# Patient Record
Sex: MALE | Race: WHITE | URBAN - METROPOLITAN AREA
[De-identification: names, ages, dates, MRNs, and addresses within clinical notes are randomized per-mention and may not be internally consistent; named-entity substitution may affect disease eponyms.]

---

## 2024-05-07 ENCOUNTER — OFFICE VISIT (OUTPATIENT)
Dept: INTERNAL MEDICINE CLINIC | Facility: CLINIC | Age: 44
End: 2024-05-07

## 2024-05-07 VITALS
RESPIRATION RATE: 18 BRPM | WEIGHT: 219 LBS | SYSTOLIC BLOOD PRESSURE: 128 MMHG | HEART RATE: 72 BPM | DIASTOLIC BLOOD PRESSURE: 83 MMHG

## 2024-05-07 DIAGNOSIS — Z00.00 PHYSICAL EXAM, ANNUAL: Primary | ICD-10-CM

## 2024-05-07 DIAGNOSIS — Z12.11 COLON CANCER SCREENING: ICD-10-CM

## 2024-05-07 PROCEDURE — 90715 TDAP VACCINE 7 YRS/> IM: CPT | Performed by: INTERNAL MEDICINE

## 2024-05-07 PROCEDURE — 3074F SYST BP LT 130 MM HG: CPT | Performed by: INTERNAL MEDICINE

## 2024-05-07 PROCEDURE — 3079F DIAST BP 80-89 MM HG: CPT | Performed by: INTERNAL MEDICINE

## 2024-05-07 PROCEDURE — 99396 PREV VISIT EST AGE 40-64: CPT | Performed by: INTERNAL MEDICINE

## 2024-05-07 PROCEDURE — 90471 IMMUNIZATION ADMIN: CPT | Performed by: INTERNAL MEDICINE

## 2024-05-07 RX ORDER — CHLORTHALIDONE 25 MG/1
25 TABLET ORAL DAILY
COMMUNITY
Start: 2024-05-07

## 2024-05-07 RX ORDER — METOPROLOL SUCCINATE 100 MG/1
100 TABLET, EXTENDED RELEASE ORAL DAILY
COMMUNITY
Start: 2024-05-07

## 2024-05-07 RX ORDER — AMLODIPINE BESYLATE 10 MG/1
10 TABLET ORAL DAILY
COMMUNITY
Start: 2024-05-07

## 2024-05-12 ENCOUNTER — TELEPHONE (OUTPATIENT)
Dept: INTERNAL MEDICINE CLINIC | Facility: CLINIC | Age: 44
End: 2024-05-12

## 2024-05-12 NOTE — PROGRESS NOTES
Subjective:     Patient ID: Jarrod Arellano is a 43 year old male.  Presents for physical exam  HPI  Patient is new to the practice, states that he has been feeling well, he is being treated for hypertension for several years now takes medications on a regular basis reports no side effects.  He denies chest pains or shortness .  He works physically    Review of Systems       Constitutional:  Negative for decreased activity, fever, irritability and lethargy  Cardiovascular:  Negative for chest pain and irregular heartbeat/palpitations  Respiratory:  Negative for cough, dyspnea and wheezing.  Eyes:  Negative for eye discharge and vision loss  Endocrine:  Negative for polydipsia and polyphagia  Integumentary:  Negative for pruritus and rash  Neurological:  Negative for gait disturbance, paresthesias.   Psychiatric:  Negative for inappropriate interaction and psychiatric symptoms    Allergies:No Known Allergies    History reviewed. No pertinent past medical history.   History reviewed. No pertinent surgical history.   History reviewed. No pertinent family history.   Social History:   Social History     Socioeconomic History    Marital status:    Tobacco Use    Smoking status: Unknown   Vaping Use    Vaping status: Every Day    Substances: Nicotine   Substance and Sexual Activity    Alcohol use: Yes     Comment: occ    Drug use: Never        /83 (BP Location: Left arm, Patient Position: Sitting, Cuff Size: large)   Pulse 72   Resp 18   Wt 219 lb (99.3 kg)    Physical Exam  Constitutional:       Appearance: Normal appearance.   HENT:      Head: Normocephalic and atraumatic.      Right Ear: Tympanic membrane, ear canal and external ear normal.      Left Ear: Tympanic membrane, ear canal and external ear normal.   Eyes:      General:         Left eye: No discharge.      Extraocular Movements: Extraocular movements intact.      Conjunctiva/sclera: Conjunctivae normal.      Pupils: Pupils are equal, round,  and reactive to light.   Neck:      Vascular: No carotid bruit.   Cardiovascular:      Rate and Rhythm: Normal rate and regular rhythm.      Heart sounds: No murmur heard.     No gallop.   Pulmonary:      Effort: Pulmonary effort is normal. No respiratory distress.      Breath sounds: No wheezing or rhonchi.   Abdominal:      General: Bowel sounds are normal.      Palpations: Abdomen is soft. There is no mass.      Tenderness: There is no abdominal tenderness. There is no guarding or rebound.   Musculoskeletal:         General: Normal range of motion.      Cervical back: Normal range of motion and neck supple.      Right lower leg: No edema.      Left lower leg: No edema.   Lymphadenopathy:      Cervical: No cervical adenopathy.   Skin:     General: Skin is warm.      Coloration: Skin is not jaundiced.   Neurological:      General: No focal deficit present.      Mental Status: He is alert and oriented to person, place, and time. Mental status is at baseline.   Psychiatric:         Mood and Affect: Mood normal.         Behavior: Behavior normal.         Thought Content: Thought content normal.         Assessment & Plan:   1. Physical exam, annual patient will continue healthy diet, regular physical activities encouraged we will check labs   2. Colon cancer screening referred patient to see gastroenterology discussed procedure   3 primary hypertension controlled on metoprolol, amlodipine, chlorthalidone,.      Monitor kidney function test and potassium periodically    Orders Placed This Encounter   Procedures    CBC With Differential With Platelet    Comp Metabolic Panel (14)    TSH W Reflex To Free T4    Lipid Panel    Urinalysis with Culture Reflex [E]    TETANUS, DIPHTHERIA TOXOIDS AND ACELLULAR PERTUSIS VACCINE (TDAP), >7 YEARS, IM USE       Meds This Visit:  Requested Prescriptions      No prescriptions requested or ordered in this encounter       Imaging & Referrals:  TETANUS, DIPHTHERIA TOXOIDS AND ACELLULAR  PERTUSIS VACCINE (TDAP), >7 YEARS, IM USE  GASTRO - INTERNAL     Follow-up in 6 months

## 2024-05-19 ENCOUNTER — APPOINTMENT (OUTPATIENT)
Dept: GENERAL RADIOLOGY | Age: 44
End: 2024-05-19
Attending: EMERGENCY MEDICINE

## 2024-05-19 ENCOUNTER — HOSPITAL ENCOUNTER (OUTPATIENT)
Age: 44
Discharge: HOME | End: 2024-05-19
Attending: EMERGENCY MEDICINE

## 2024-05-19 VITALS
HEART RATE: 82 BPM | SYSTOLIC BLOOD PRESSURE: 143 MMHG | OXYGEN SATURATION: 97 % | TEMPERATURE: 99 F | RESPIRATION RATE: 16 BRPM | DIASTOLIC BLOOD PRESSURE: 93 MMHG

## 2024-05-19 DIAGNOSIS — R58 BLEEDING FROM FINGER: Primary | ICD-10-CM

## 2024-05-19 PROCEDURE — 73140 X-RAY EXAM OF FINGER(S): CPT | Performed by: EMERGENCY MEDICINE

## 2024-05-19 PROCEDURE — 12001 RPR S/N/AX/GEN/TRNK 2.5CM/<: CPT

## 2024-05-19 PROCEDURE — 99203 OFFICE O/P NEW LOW 30 MIN: CPT

## 2024-05-19 PROCEDURE — 99213 OFFICE O/P EST LOW 20 MIN: CPT

## 2024-05-19 NOTE — ED PROVIDER NOTES
Patient Seen in: [facility] [place]      History     Chief Complaint   Patient presents with    Wound Care     Entered by patient     Stated Complaint: Wound Care    Subjective:     43-year-old male presents today for evaluation of bleeding from his right lateral finger.  Patient reports he had a blood blister there for the past week.  Reports it popped yesterday.  He changed to Band-Aid this morning and started bleeding would not stop.  He says it has been bleeding for over 2 hours.  No blood thinners.  Symptoms are acute mild.  Patient is not sure if there is anything in the finger.  Symptoms are acute mild.    Objective:   No pertinent past medical history.            No pertinent past surgical history.              No pertinent social history.              Physical Exam     ED Triage Vitals [05/19/24 0853]   BP (!) 143/93   Pulse 82   Resp 16   Temp 98.5 °F (36.9 °C)   Temp src Temporal   SpO2 97 %   O2 Device None (Room air)       Current:BP (!) 143/93   Pulse 82   Temp 98.5 °F (36.9 °C) (Temporal)   Resp 16   SpO2 97%         Physical Exam  Vitals reviewed.   Constitutional:       General: He is not in acute distress.     Appearance: Normal appearance. He is not ill-appearing or toxic-appearing.   HENT:      Head: Normocephalic and atraumatic.      Mouth/Throat:      Mouth: Mucous membranes are moist.      Pharynx: No pharyngeal swelling.   Eyes:      Conjunctiva/sclera: Conjunctivae normal.   Musculoskeletal:        Hands:       Cervical back: Neck supple.   Skin:     General: Skin is warm and dry.   Neurological:      Mental Status: He is alert and oriented to person, place, and time.   Psychiatric:         Mood and Affect: Mood normal.         ED Course   Labs Reviewed - No data to display  Imaging:  XR FINGER(S) (MIN 2 VIEWS), RIGHT 4TH (CPT=73140)    Result Date: 5/19/2024  PROCEDURE: XR FINGER(S) (MIN 2 VIEWS), RIGHT 4TH (CPT=73140)  COMPARISON: None.  INDICATIONS: Pain and bleeding wound,  distal aspect of right hand 4th digit x 1 week. No known trauma.  TECHNIQUE: Three-view Findings and impression:  Normal alignment with no fracture.  No radiopaque body or soft tissue gas.  No osteomyelitis.     Dictated by (CST): Ceferino Suggs MD on 5/19/2024 at 9:35 AM     Finalized by (CST): Ceferino Suggs MD on 5/19/2024 at 9:35 AM           ED Course as of 05/19/24 0939  ------------------------------------------------------------  Time: 05/19 0917  Comment: Wound irrigated.  No foreign body visualized.  Dermabond placed.  Bleeding is stopped.  ------------------------------------------------------------  Time: 05/19 0938  Comment: X-rays unremarkable.  No bleeding.  Will discharge home.            MDM        43 year old male with right finger bleeding.  Will check imaging.    Differential diagnosis (including but not limited to):  Puncture, foreign body, coagulopathy    ED course:  Pulse Ox: 97% on room air which is normal      Comment: Please note this report has been produced using speech recognition software and may contain errors related to that system including errors in grammar, punctuation, and spelling, as well as words and phrases that may be inappropriate. If there are any questions or concerns please feel free to contact the dictating provider for clarification.                                     Medical Decision Making      Disposition and Plan     Clinical Impression:  1. Bleeding from finger         Disposition:  Discharge  5/19/2024  9:39 am    Follow-up:  Gio Gonzalez MD  [facility] [id number] [phone]    Schedule an appointment as soon as possible for a visit   This is a hand specialist.          Medications Prescribed:  Current Discharge Medication List                                 Herb Garcia MD  5/19/2024  9:13 AM

## 2024-05-19 NOTE — DISCHARGE INSTRUCTIONS
Thank you for visiting our immediate care for your health care needs.  Please follow up with hand doctor as referred next week.  If you have any additional problems please return to the immediate care.

## 2024-05-19 NOTE — ED INITIAL ASSESSMENT (HPI)
Patient states he noticed a blood blister to this right ring fingertip a couple days ago/ he denies injury  States maybe there is a splinter there  He used neosporin and bandaids at home   This morning the area wont stop bleeding

## 2024-06-26 ENCOUNTER — TELEPHONE (OUTPATIENT)
Dept: INTERNAL MEDICINE CLINIC | Facility: CLINIC | Age: 44
End: 2024-06-26

## 2024-06-26 RX ORDER — METOPROLOL SUCCINATE 100 MG/1
100 TABLET, EXTENDED RELEASE ORAL DAILY
Qty: 90 TABLET | Refills: 0 | Status: SHIPPED | OUTPATIENT
Start: 2024-06-26

## 2024-06-26 NOTE — TELEPHONE ENCOUNTER
Please review.  Protocol failed / Has no protocol.    [facility] message sent to patient to complete labs pended from last office visit 5/7/2024.     Requested Prescriptions   Pending Prescriptions Disp Refills    metoprolol succinate  MG Oral Tablet 24 Hr  0     Sig: Take 1 tablet (100 mg total) by mouth daily.       Hypertension Medications Protocol Failed - 6/24/2024 11:18 AM        Failed - CMP or BMP in past 12 months        Failed - Last BP reading less than 140/90     BP Readings from Last 1 Encounters:   05/19/24 (!) 143/93               Failed - EGFRCR or GFRNAA > 50     GFR Evaluation            Passed - In person appointment or virtual visit in the past 12 mos or appointment in next 3 mos     Recent Outpatient Visits              1 month ago Physical exam, annual    [facility], [facility] [address], [place] Kandel, Ninel, MD    Office Visit                           Recent Outpatient Visits              1 month ago Physical exam, annual    [facility], [facility], [place] Kandel, Ninel, MD    Office Visit

## 2024-06-26 NOTE — TELEPHONE ENCOUNTER
Patient requested refill on mychart 6/24 , completely out     [id number] - [place], [place] - [address] AT [facility]     Medication Detail    Medication Quantity Refills Start End   metoprolol succinate  MG Oral Tablet 24 Hr -- -- 5/7/2024 --   Sig:   Take 1 tablet (100 mg total) by mouth daily.     Route:   Oral     Class:   Historical     Order #:   [id number]

## 2024-07-13 ENCOUNTER — LAB ENCOUNTER (OUTPATIENT)
Dept: LAB | Facility: REFERENCE LAB | Age: 44
End: 2024-07-13
Attending: INTERNAL MEDICINE
Payer: COMMERCIAL

## 2024-07-13 DIAGNOSIS — Z00.00 PHYSICAL EXAM, ANNUAL: ICD-10-CM

## 2024-07-13 LAB
ALBUMIN SERPL-MCNC: 4.8 G/DL (ref 3.2–4.8)
ALBUMIN/GLOB SERPL: 1.7 {RATIO} (ref 1–2)
ALP LIVER SERPL-CCNC: 68 U/L
ALT SERPL-CCNC: 44 U/L
ANION GAP SERPL CALC-SCNC: 7 MMOL/L (ref 0–18)
AST SERPL-CCNC: 32 U/L (ref ?–34)
BASOPHILS # BLD AUTO: 0.01 X10(3) UL (ref 0–0.2)
BASOPHILS NFR BLD AUTO: 0.2 %
BILIRUB SERPL-MCNC: 0.8 MG/DL (ref 0.3–1.2)
BILIRUB UR QL: NEGATIVE
BUN BLD-MCNC: 15 MG/DL (ref 9–23)
BUN/CREAT SERPL: 13.4 (ref 10–20)
CALCIUM BLD-MCNC: 9.8 MG/DL (ref 8.7–10.4)
CHLORIDE SERPL-SCNC: 102 MMOL/L (ref 98–112)
CHOLEST SERPL-MCNC: 180 MG/DL (ref ?–200)
CO2 SERPL-SCNC: 30 MMOL/L (ref 21–32)
COLOR UR: YELLOW
CREAT BLD-MCNC: 1.12 MG/DL
DEPRECATED RDW RBC AUTO: 34.4 FL (ref 35.1–46.3)
EGFRCR SERPLBLD CKD-EPI 2021: 83 ML/MIN/1.73M2 (ref 60–?)
EOSINOPHIL # BLD AUTO: 0.04 X10(3) UL (ref 0–0.7)
EOSINOPHIL NFR BLD AUTO: 0.8 %
ERYTHROCYTE [DISTWIDTH] IN BLOOD BY AUTOMATED COUNT: 14.9 % (ref 11–15)
FASTING PATIENT LIPID ANSWER: YES
FASTING STATUS PATIENT QL REPORTED: YES
GLOBULIN PLAS-MCNC: 2.9 G/DL (ref 2–3.5)
GLUCOSE BLD-MCNC: 102 MG/DL (ref 70–99)
GLUCOSE UR-MCNC: NORMAL MG/DL
HCT VFR BLD AUTO: 45.8 %
HDLC SERPL-MCNC: 32 MG/DL (ref 40–59)
HGB BLD-MCNC: 14.7 G/DL
HGB UR QL STRIP.AUTO: NEGATIVE
IMM GRANULOCYTES # BLD AUTO: 0.02 X10(3) UL (ref 0–1)
IMM GRANULOCYTES NFR BLD: 0.4 %
KETONES UR-MCNC: NEGATIVE MG/DL
LDLC SERPL CALC-MCNC: 117 MG/DL (ref ?–100)
LEUKOCYTE ESTERASE UR QL STRIP.AUTO: NEGATIVE
LYMPHOCYTES # BLD AUTO: 1.41 X10(3) UL (ref 1–4)
LYMPHOCYTES NFR BLD AUTO: 28.3 %
MCH RBC QN AUTO: 22 PG (ref 26–34)
MCHC RBC AUTO-ENTMCNC: 32.1 G/DL (ref 31–37)
MCV RBC AUTO: 68.7 FL
MONOCYTES # BLD AUTO: 0.39 X10(3) UL (ref 0.1–1)
MONOCYTES NFR BLD AUTO: 7.8 %
NEUTROPHILS # BLD AUTO: 3.11 X10 (3) UL (ref 1.5–7.7)
NEUTROPHILS # BLD AUTO: 3.11 X10(3) UL (ref 1.5–7.7)
NEUTROPHILS NFR BLD AUTO: 62.5 %
NITRITE UR QL STRIP.AUTO: NEGATIVE
NONHDLC SERPL-MCNC: 148 MG/DL (ref ?–130)
OSMOLALITY SERPL CALC.SUM OF ELEC: 289 MOSM/KG (ref 275–295)
PH UR: 5.5 [PH] (ref 5–8)
PLATELET # BLD AUTO: 189 10(3)UL (ref 150–450)
PLATELETS.RETICULATED NFR BLD AUTO: 11.6 % (ref 0–7)
POTASSIUM SERPL-SCNC: 4.4 MMOL/L (ref 3.5–5.1)
PROT SERPL-MCNC: 7.7 G/DL (ref 5.7–8.2)
RBC # BLD AUTO: 6.67 X10(6)UL
SODIUM SERPL-SCNC: 139 MMOL/L (ref 136–145)
SP GR UR STRIP: 1.03 (ref 1–1.03)
TRIGL SERPL-MCNC: 171 MG/DL (ref 30–149)
TSI SER-ACNC: 1.29 MIU/ML (ref 0.55–4.78)
UROBILINOGEN UR STRIP-ACNC: NORMAL
VLDLC SERPL CALC-MCNC: 30 MG/DL (ref 0–30)
WBC # BLD AUTO: 5 X10(3) UL (ref 4–11)

## 2024-07-13 PROCEDURE — 85025 COMPLETE CBC W/AUTO DIFF WBC: CPT

## 2024-07-13 PROCEDURE — 80053 COMPREHEN METABOLIC PANEL: CPT

## 2024-07-13 PROCEDURE — 85060 BLOOD SMEAR INTERPRETATION: CPT

## 2024-07-13 PROCEDURE — 84443 ASSAY THYROID STIM HORMONE: CPT

## 2024-07-13 PROCEDURE — 80061 LIPID PANEL: CPT

## 2024-07-13 PROCEDURE — 81001 URINALYSIS AUTO W/SCOPE: CPT

## 2024-07-13 PROCEDURE — 36415 COLL VENOUS BLD VENIPUNCTURE: CPT

## 2024-08-27 ENCOUNTER — TELEPHONE (OUTPATIENT)
Dept: INTERNAL MEDICINE CLINIC | Facility: CLINIC | Age: 44
End: 2024-08-27

## 2024-08-27 NOTE — TELEPHONE ENCOUNTER
Current Outpatient Medications:     metoprolol succinate  MG Oral Tablet 24 Hr, Take 1 tablet (100 mg total) by mouth daily. **Please complete labs, Disp: 90 tablet, Rfl: 0

## 2024-08-28 ENCOUNTER — TELEPHONE (OUTPATIENT)
Dept: FAMILY MEDICINE CLINIC | Facility: CLINIC | Age: 44
End: 2024-08-28

## 2024-08-28 NOTE — TELEPHONE ENCOUNTER
Reason for request:    Patient requested new RX    Pharmacy comments:  PT requests Metropolol big Dosing

## 2024-08-29 ENCOUNTER — TELEPHONE (OUTPATIENT)
Dept: INTERNAL MEDICINE CLINIC | Facility: CLINIC | Age: 44
End: 2024-08-29

## 2024-08-29 RX ORDER — AMLODIPINE BESYLATE 10 MG/1
10 TABLET ORAL DAILY
Qty: 90 TABLET | Refills: 0 | Status: SHIPPED | OUTPATIENT
Start: 2024-08-29

## 2024-08-29 RX ORDER — METOPROLOL SUCCINATE 100 MG/1
100 TABLET, EXTENDED RELEASE ORAL DAILY
Qty: 90 TABLET | Refills: 0 | Status: SHIPPED | OUTPATIENT
Start: 2024-08-29

## 2024-08-29 RX ORDER — CHLORTHALIDONE 25 MG/1
25 TABLET ORAL DAILY
Qty: 90 TABLET | Refills: 0 | Status: SHIPPED | OUTPATIENT
Start: 2024-08-29

## 2024-08-29 NOTE — TELEPHONE ENCOUNTER
Please review; protocol failed/No Protocol    Metoprolol ER 100mg: Please advise on patient's comment below  Patient comment: Im supposed to be taking 1.5 pills a day. I keep running out early. Also each month i have to manually request the refill from you. Can i get a few authorized in advance pls       Requested Prescriptions   Pending Prescriptions Disp Refills    chlorthalidone 25 MG Oral Tab  0     Sig: Take 1 tablet (25 mg total) by mouth daily.       Hypertension Medications Protocol Failed - 8/27/2024  4:16 PM        Failed - Last BP reading less than 140/90     BP Readings from Last 1 Encounters:   05/19/24 (!) 143/93               Passed - CMP or BMP in past 12 months        Passed - In person appointment or virtual visit in the past 12 mos or appointment in next 3 mos     Recent [facility]              3 [facility]    [facility]                      Passed - EGFRCR or GFRNAA > 50     GFR Evaluation  EGFRCR: 83 , resulted on 7/13/2024            amLODIPine 10 MG Oral Tab  0     Sig: Take 1 tablet (10 mg total) by mouth daily.       Hypertension Medications Protocol Failed - 8/27/2024  4:16 PM        Failed - Last BP reading less than 140/90     BP Readings from Last 1 Encounters:   05/19/24 (!) 143/93               Passed - CMP or BMP in past 12 months        Passed - In person appointment or virtual visit in the past 12 mos or appointment in next 3 mos     Recent Outpatient Visits              3 months ago Physical exam, annual    [facility], [facility] [place] Kandel, Ninel, MD    Office Visit                      Passed - EGFRCR or GFRNAA > 50     GFR Evaluation  EGFRCR: 83 , resulted on 7/13/2024            metoprolol succinate  MG Oral Tablet 24 Hr 90 tablet 0     Sig: Take 1 tablet (100 mg total) by mouth daily. **Please complete labs       Hypertension Medications  Protocol Failed - 8/27/2024  4:16 PM        Failed - Last BP reading less than 140/90     BP Readings from Last 1 Encounters:   05/19/24 (!) 143/93               Passed - CMP or BMP in past 12 months        Passed - In person appointment or virtual visit in the past 12 mos or appointment in next 3 mos     Recent Outpatient Visits              3 months ago Physical exam, annual    [facility], [facility] [address], [place] Kandel, Ninel, MD    Office Visit                      Passed - EGFRCR or GFRNAA > 50     GFR Evaluation  EGFRCR: 83 , resulted on 7/13/2024               Recent Outpatient Visits              3 months ago Physical exam, annual    [facility], [facility], [place] Kandel, Ninel, MD    Office Visit

## 2024-08-30 NOTE — TELEPHONE ENCOUNTER
Yahaira Duenas, RN 8/30/2024 8:31 AM CDT        ----- Message -----  From: Jarrod Arellano  Sent: 8/29/2024 7:46 PM CDT  To: Em Rn Triage  Subject: Medication refill     Thank you but I’m out of the Metoprolol in three days. My old dr had prescribed me 1.5 pills a day. I just realized it was switched to 1 per day which is why I ran out early. Can you pls refill that one and I’ll make an appt.     Thanks

## 2024-10-29 ENCOUNTER — OFFICE VISIT (OUTPATIENT)
Dept: INTERNAL MEDICINE CLINIC | Facility: CLINIC | Age: 44
End: 2024-10-29

## 2024-10-29 VITALS
WEIGHT: 218.13 LBS | HEART RATE: 76 BPM | SYSTOLIC BLOOD PRESSURE: 126 MMHG | HEIGHT: 73 IN | DIASTOLIC BLOOD PRESSURE: 83 MMHG | BODY MASS INDEX: 28.91 KG/M2

## 2024-10-29 DIAGNOSIS — I10 PRIMARY HYPERTENSION: Primary | ICD-10-CM

## 2024-10-29 DIAGNOSIS — Z71.6 ENCOUNTER FOR TOBACCO USE CESSATION COUNSELING: ICD-10-CM

## 2024-10-29 PROCEDURE — 99213 OFFICE O/P EST LOW 20 MIN: CPT | Performed by: INTERNAL MEDICINE

## 2024-10-29 PROCEDURE — 3008F BODY MASS INDEX DOCD: CPT | Performed by: INTERNAL MEDICINE

## 2024-10-29 PROCEDURE — 3079F DIAST BP 80-89 MM HG: CPT | Performed by: INTERNAL MEDICINE

## 2024-10-29 PROCEDURE — 3074F SYST BP LT 130 MM HG: CPT | Performed by: INTERNAL MEDICINE

## 2024-10-29 RX ORDER — METOPROLOL SUCCINATE 100 MG/1
TABLET, EXTENDED RELEASE ORAL
Qty: 135 TABLET | Refills: 3 | Status: SHIPPED | OUTPATIENT
Start: 2024-10-29

## 2024-10-29 RX ORDER — AMLODIPINE BESYLATE 10 MG/1
10 TABLET ORAL DAILY
Qty: 90 TABLET | Refills: 3 | Status: SHIPPED | OUTPATIENT
Start: 2024-10-29

## 2024-10-29 RX ORDER — CHLORTHALIDONE 25 MG/1
25 TABLET ORAL DAILY
Qty: 90 TABLET | Refills: 1 | Status: SHIPPED | OUTPATIENT
Start: 2024-10-29

## 2024-10-31 ENCOUNTER — TELEPHONE (OUTPATIENT)
Dept: INTERNAL MEDICINE CLINIC | Facility: CLINIC | Age: 44
End: 2024-10-31

## 2024-10-31 NOTE — TELEPHONE ENCOUNTER
Patient called stating that his prescriptions that were sent on 10/29/2024 were sent to the wrong pharmacy. Per patient please send the medications to the following pharmacy:    [facility]     [facility] [address] [place] [address]    He stated he is almost out of the Metoprolol.

## 2024-11-03 NOTE — PROGRESS NOTES
Subjective:     Patient ID: Jarrod Arellano is a 44 year old male.  For follow-up on hypertension    HPI  Patient reports that he has been feeling well, takes medication as prescribed.  Continues to smoke, states that not ready to quit.  Does not check blood pressure at home      Current Outpatient Medications   Medication Sig Dispense Refill    metoprolol succinate  MG Oral Tablet 24 Hr Take  1  1/2tab po daily 135 tablet 3    chlorthalidone 25 MG Oral Tab Take 1 tablet (25 mg total) by mouth daily. 90 tablet 1    amLODIPine 10 MG Oral Tab Take 1 tablet (10 mg total) by mouth daily. 90 tablet 3     Allergies:Allergies[1]    History reviewed. No pertinent past medical history.   History reviewed. No pertinent surgical history.   History reviewed. No pertinent family history.   Social History:   Social History     Socioeconomic History    Marital status:    Tobacco Use    Smoking status: Every Day     Types: Cigarettes   Vaping Use    Vaping status: Every Day    Substances: Nicotine   Substance and Sexual Activity    Alcohol use: Yes     Comment: occ    Drug use: Never        /83 (BP Location: Left arm, Patient Position: Sitting, Cuff Size: adult)   Pulse 76   Ht 6' 1\" (1.854 m)   Wt 218 lb 1.6 oz (98.9 kg)   BMI 28.77 kg/m²    Physical Exam  Constitutional:       Appearance: Normal appearance.   HENT:      Right Ear: Ear canal and external ear normal.      Left Ear: Ear canal and external ear normal.   Eyes:      General: No scleral icterus.     Extraocular Movements: Extraocular movements intact.      Conjunctiva/sclera: Conjunctivae normal.      Pupils: Pupils are equal, round, and reactive to light.   Neck:      Vascular: No carotid bruit.   Cardiovascular:      Rate and Rhythm: Normal rate and regular rhythm.   Pulmonary:      Effort: Pulmonary effort is normal.      Breath sounds: No wheezing or rales.   Chest:      Chest wall: No tenderness.   Abdominal:      General: Bowel sounds are  normal.      Palpations: Abdomen is soft. There is no mass.      Tenderness: There is no guarding or rebound.   Musculoskeletal:         General: Normal range of motion.      Cervical back: Normal range of motion and neck supple.      Right lower leg: No edema.      Left lower leg: No edema.   Lymphadenopathy:      Cervical: No cervical adenopathy.   Skin:     General: Skin is warm.      Coloration: Skin is not jaundiced.   Neurological:      General: No focal deficit present.      Mental Status: He is alert and oriented to person, place, and time. Mental status is at baseline.   Psychiatric:         Mood and Affect: Mood normal.         Behavior: Behavior normal.         Thought Content: Thought content normal.         Assessment & Plan:   1. Primary hypertension: Current medication, will check BMP advised that lites should be checked at least twice a year.   2. Encounter for tobacco use cessation counseling    I advised to consider colon cancer screening with colonoscopy,    Orders Placed This Encounter   Procedures    Basic Metabolic Panel (8) [E]    Smoking Cessation less than 3 minutes       Meds This Visit:  Requested Prescriptions     Signed Prescriptions Disp Refills    metoprolol succinate  MG Oral Tablet 24 Hr 135 tablet 3     Sig: Take  1  1/2tab po daily    chlorthalidone 25 MG Oral Tab 90 tablet 1     Sig: Take 1 tablet (25 mg total) by mouth daily.    amLODIPine 10 MG Oral Tab 90 tablet 3     Sig: Take 1 tablet (10 mg total) by mouth daily.       Imaging & Referrals:  EKG 12-LEAD            [1]   Allergies  Allergen Reactions    Tree Nuts NAUSEA AND VOMITING and WHEEZING    Shellfish Allergy NAUSEA AND VOMITING

## 2024-11-27 RX ORDER — METOPROLOL SUCCINATE 100 MG/1
TABLET, EXTENDED RELEASE ORAL
Qty: 90 TABLET | Refills: 0 | OUTPATIENT
Start: 2024-11-27

## 2024-11-27 NOTE — TELEPHONE ENCOUNTER
[employer] message sent for dose and preferred pharmacy clarification .        10/29/24 OFFICE VISIT note;    Metoprolol Succinate 100 MG Take  1  1/2tab po daily   MEDICATION RECORD :   Disp Refills Start End    metoprolol succinate  MG Oral Tablet 24 Hr 135 tablet 3 10/29/2024 --    Sig: Take  1  1/2tab po daily    Sent to pharmacy as: Metoprolol Succinate  MG Oral Tablet Extended Release 24 Hour (Toprol XL)    E-Prescribing Status: Receipt confirmed by pharmacy (10/29/2024  2:15 PM CDT)      Pharmacy    [id number] - [place], [place] - [address] AT [facility], [phone], [phone]

## 2025-02-10 DIAGNOSIS — I10 PRIMARY HYPERTENSION: Primary | ICD-10-CM

## 2025-02-13 RX ORDER — CHLORTHALIDONE 25 MG/1
25 TABLET ORAL DAILY
Qty: 90 TABLET | Refills: 3 | Status: SHIPPED | OUTPATIENT
Start: 2025-02-13

## 2025-02-13 NOTE — TELEPHONE ENCOUNTER
Requested Prescriptions     Pending Prescriptions Disp Refills    CHLORTHALIDONE 25 MG Oral Tab [Pharmacy Med Name: CHLORTHALIDONE 25MG TABLETS] 90 tablet 1     Sig: TAKE 1 TABLET(25 MG) BY MOUTH DAILY         Recent Visits  Date Type Provider Dept   10/29/24 Office Visit Leann Soares MD [facility]   05/07/24 Office Visit Leann Soares MD [facility]   Showing recent visits within past 540 days with a meds authorizing provider and meeting all other requirements  Future Appointments  No visits were found meeting these conditions.  Showing future appointments within next 150 days with a meds authorizing provider and meeting all other requirements    Hypertensive Medications  Protocol Criteria:  Appointment scheduled in the past 6 months or in the next 3 months  BMP or CMP in the past 12 months  Creatinine result < 2  Recent Outpatient Visits              3 months ago Primary hypertension    [facility] [place] Kandel, Ninel, MD    Office Visit    9 months ago Physical exam, annual    [facility], [address], [place] Leann Soares MD    Office Visit            Lab Results   Component Value Date     (H) 07/13/2024    BUN 15 07/13/2024    CREATSERUM 1.12 07/13/2024    BUNCREA 13.4 07/13/2024    CA 9.8 07/13/2024    AST 32 07/13/2024    ALT 44 07/13/2024    BILT 0.8 07/13/2024    TP 7.7 07/13/2024    ALB 4.8 07/13/2024     07/13/2024    K 4.4 07/13/2024     07/13/2024    CO2 30.0 07/13/2024    GLOBULIN 2.9 07/13/2024    ANIONGAP 7 07/13/2024    OSMOCALC 289 07/13/2024

## 2025-03-05 ENCOUNTER — APPOINTMENT (OUTPATIENT)
Dept: CT IMAGING | Age: 45
End: 2025-03-05
Attending: NURSE PRACTITIONER
Payer: COMMERCIAL

## 2025-03-05 ENCOUNTER — HOSPITAL ENCOUNTER (OUTPATIENT)
Age: 45
Discharge: HOME | End: 2025-03-05
Payer: COMMERCIAL

## 2025-03-05 VITALS
RESPIRATION RATE: 18 BRPM | HEART RATE: 88 BPM | OXYGEN SATURATION: 97 % | TEMPERATURE: 99 F | SYSTOLIC BLOOD PRESSURE: 152 MMHG | DIASTOLIC BLOOD PRESSURE: 99 MMHG

## 2025-03-05 DIAGNOSIS — K63.89 EPIPLOIC APPENDAGITIS: Primary | ICD-10-CM

## 2025-03-05 LAB
#MXD IC: 0.6 X10ˆ3/UL (ref 0.1–1)
BILIRUB UR QL STRIP: NEGATIVE
BUN BLD-MCNC: 16 MG/DL (ref 7–18)
CHLORIDE BLD-SCNC: 98 MMOL/L (ref 98–112)
CLARITY UR: CLEAR
CO2 BLD-SCNC: 28 MMOL/L (ref 21–32)
COLOR UR: YELLOW
CREAT BLD-MCNC: 1.1 MG/DL
EGFRCR SERPLBLD CKD-EPI 2021: 84 ML/MIN/1.73M2 (ref 60–?)
GLUCOSE BLD-MCNC: 102 MG/DL (ref 70–99)
GLUCOSE UR STRIP-MCNC: NEGATIVE MG/DL
HCT VFR BLD AUTO: 44.8 %
HCT VFR BLD CALC: 47 %
HGB BLD-MCNC: 14 G/DL
HGB UR QL STRIP: NEGATIVE
ISTAT IONIZED CALCIUM FOR CHEM 8: 1.18 MMOL/L (ref 1.12–1.32)
KETONES UR STRIP-MCNC: NEGATIVE MG/DL
LEUKOCYTE ESTERASE UR QL STRIP: NEGATIVE
LYMPHOCYTES # BLD AUTO: 1.7 X10ˆ3/UL (ref 1–4)
LYMPHOCYTES NFR BLD AUTO: 24.5 %
MCH RBC QN AUTO: 22.3 PG (ref 26–34)
MCHC RBC AUTO-ENTMCNC: 31.3 G/DL (ref 31–37)
MCV RBC AUTO: 71.2 FL (ref 80–100)
MIXED CELL %: 8.3 %
NEUTROPHILS # BLD AUTO: 4.5 X10ˆ3/UL (ref 1.5–7.7)
NEUTROPHILS NFR BLD AUTO: 67.2 %
NITRITE UR QL STRIP: NEGATIVE
PH UR STRIP: 6 [PH]
PLATELET # BLD AUTO: 182 X10ˆ3/UL (ref 150–450)
POTASSIUM BLD-SCNC: 4 MMOL/L (ref 3.6–5.1)
PROT UR STRIP-MCNC: NEGATIVE MG/DL
RBC # BLD AUTO: 6.29 X10ˆ6/UL
SODIUM BLD-SCNC: 137 MMOL/L (ref 136–145)
SP GR UR STRIP: 1.02
UROBILINOGEN UR STRIP-ACNC: <2 MG/DL
WBC # BLD AUTO: 6.8 X10ˆ3/UL (ref 4–11)

## 2025-03-05 PROCEDURE — 85025 COMPLETE CBC W/AUTO DIFF WBC: CPT | Performed by: NURSE PRACTITIONER

## 2025-03-05 PROCEDURE — 99214 OFFICE O/P EST MOD 30 MIN: CPT

## 2025-03-05 PROCEDURE — 36415 COLL VENOUS BLD VENIPUNCTURE: CPT

## 2025-03-05 PROCEDURE — 80047 BASIC METABLC PNL IONIZED CA: CPT

## 2025-03-05 PROCEDURE — 74176 CT ABD & PELVIS W/O CONTRAST: CPT | Performed by: NURSE PRACTITIONER

## 2025-03-05 PROCEDURE — 81002 URINALYSIS NONAUTO W/O SCOPE: CPT

## 2025-03-05 NOTE — DISCHARGE INSTRUCTIONS
Ice to the area, ibuprofen for pain symptoms should resolve in the next week or 2  Follow with your doctor return for concerns

## 2025-03-05 NOTE — ED PROVIDER NOTES
Patient Seen in: [facility] [place]      History     Chief Complaint   Patient presents with    Abdomen/Flank Pain    Urinary Symptoms     Stated Complaint: Abdomen pain; Excessive urination    Subjective:   HPI      44-year-old male history of hypertension, presents to the immediate care with left sided abdominal pain just above his belt line.  Distinct area of Pain -began Sunday along with urinary frequency and urgency.  No dysuria.  No fever.  No vomiting.  Normal bowel movements.    Objective:     Past Medical History:    Essential hypertension              History reviewed. No pertinent surgical history.             Social History     Socioeconomic History    Marital status:    Tobacco Use    Smoking status: Every Day     Types: Cigarettes   Vaping Use    Vaping status: Every Day    Substances: Nicotine   Substance and Sexual Activity    Alcohol use: Yes     Comment: occ    Drug use: Never              Review of Systems    Positive for stated complaint: Abdomen pain; Excessive urination  Other systems are as noted in HPI.  Constitutional and vital signs reviewed.      All other systems reviewed and negative except as noted above.    Physical Exam     ED Triage Vitals [03/05/25 1206]   BP (!) 152/99   Pulse 88   Resp 18   Temp 98.7 °F (37.1 °C)   Temp src Oral   SpO2 97 %   O2 Device None (Room air)       Current Vitals:   Vital Signs  BP: (!) 152/99  Pulse: 88  Resp: 18  Temp: 98.7 °F (37.1 °C)  Temp src: Oral    Oxygen Therapy  SpO2: 97 %  O2 Device: None (Room air)        Physical Exam  Vitals and nursing note reviewed.   Constitutional:       Appearance: He is well-developed. He is not ill-appearing.   Cardiovascular:      Rate and Rhythm: Normal rate.   Abdominal:      Palpations: Abdomen is soft.      Tenderness: There is abdominal tenderness in the left lower quadrant. There is no guarding.   Skin:     General: Skin is warm and dry.   Neurological:      Mental Status: He is alert.              ED Course     Labs Reviewed   POCT CBC - Abnormal; Notable for the following components:       Result Value    RBC IC 6.29 (*)     MCV IC 71.2 (*)     MCH IC 22.3 (*)     All other components within normal limits   POCT ISTAT CHEM8 CARTRIDGE - Abnormal; Notable for the following components:    ISTAT Glucose 102 (*)     All other components within normal limits   [facility] POCT URINALYSIS DIPSTICK                   MDM      Differentials for abdominal pain include but not limited to gastroenteritis, viral syndrome, appendicitis, IBS, diverticulitis, cholecystitis SBO, colic versus urinary complaints     Basic lab work unremarkable  Urine is negative  CT findings discussed with patient epiploic appendagitis-advised ice, ibuprofen monitor for any new or worsening symptoms    Medical Decision Making  Problems Addressed:  Epiploic appendagitis: acute illness or injury with systemic symptoms that poses a threat to life or bodily functions    Amount and/or Complexity of Data Reviewed  Labs: ordered. Decision-making details documented in ED Course.  Radiology: ordered. Decision-making details documented in ED Course.    Risk  OTC drugs.        Disposition and Plan     Clinical Impression:  1. Epiploic appendagitis         Disposition:  Discharge  3/5/2025  1:54 pm    Follow-up:  Yusuf Banda MD  [address] [place] [id number] [phone]      As needed    Parker Lau MD  [address] [place] [id number] [phone]    Schedule an appointment as soon as possible for a visit   If symptoms worsen          Medications Prescribed:  Discharge Medication List as of 3/5/2025  1:58 PM              Supplementary Documentation:

## 2025-03-05 NOTE — ED INITIAL ASSESSMENT (HPI)
Patient arrives ambulatory with c/o LLQ pain, \"right at the belt line\" since Sunday along with urinary frequency/urgency. Denies dysuria.

## 2025-06-02 DIAGNOSIS — I10 PRIMARY HYPERTENSION: ICD-10-CM

## 2025-06-03 ENCOUNTER — PATIENT MESSAGE (OUTPATIENT)
Dept: INTERNAL MEDICINE CLINIC | Facility: CLINIC | Age: 45
End: 2025-06-03

## 2025-06-03 RX ORDER — CHLORTHALIDONE 25 MG/1
25 TABLET ORAL DAILY
Qty: 90 TABLET | Refills: 3 | OUTPATIENT
Start: 2025-06-03

## 2025-06-03 NOTE — TELEPHONE ENCOUNTER
Outpatient Medication Detail     Disp Refills Start End    CHLORTHALIDONE 25 MG Oral Tab 90 tablet 3 2/13/2025 --    Sig - Route: TAKE 1 TABLET(25 MG) BY MOUTH DAILY - Oral    Sent to pharmacy as: Chlorthalidone 25 MG Oral Tablet ([place])    E-Prescribing Status: Receipt confirmed by pharmacy (2/13/2025  1:58 PM CST)      Associated Diagnoses    Primary hypertension  - Primary        Pharmacy    [id number] - [place], [place] - [facility] [address] AT [facility] [address], [phone], [phone]

## 2025-07-31 RX ORDER — METOPROLOL SUCCINATE 100 MG/1
TABLET, EXTENDED RELEASE ORAL
Qty: 135 TABLET | Refills: 3 | OUTPATIENT
Start: 2025-07-31